# Patient Record
(demographics unavailable — no encounter records)

---

## 2024-10-30 NOTE — REASON FOR VISIT
[Consultation Follow Up] : a consultation follow up  [Mother] : mother [Medical Records] : medical records [Home] : at home, [unfilled] , at the time of the visit. [Medical Office: ([facility])___] : at the medical office located in  [Other:____] : [unfilled] [FreeTextEntry3] : Aunt

## 2024-10-30 NOTE — REASON FOR VISIT
[Consultation Follow Up] : a consultation follow up  [Mother] : mother [Medical Records] : medical records [Home] : at home, [unfilled] , at the time of the visit. [Medical Office: ([facility])___] : at the medical office located in  [Other:____] : [unfilled] [FreeTextEntry3] : Aunt

## 2024-10-30 NOTE — HISTORY OF PRESENT ILLNESS
[de-identified] : Ammon is a 7-year-old male with history SCN2a mutation, autism, seizure disorder with vagal nerve stimulator, MBSS with aspiration of PO feedings and now s/p GT placement in February 2023 he is being seen by telehealth visit today for GI follow up. Last seen via Telehealth on 5/8/2024.  Completed intensive outpatient feeding therapy program at [facility] which was 5 days/week for 6-8 weeks.   Ammon is receiving small amounts of yogurt PO. No coughing choking gagging or distress. Receiving GT feedings of KetoVie as prescribed per neurology dietitian via bolus with syringe. The ketogenic diet was initiated in August 2023. He was previously receiving [employer] [facility] 1.2kcal/ounce. Tolerating GT feedings very well without emesis, abdominal distention, choking, gagging, cyanosis or respiratory distress.  Flushes with water after GT feeding. She uses an ace bandage to anchor the GT and ensure he does not pull on it.  BM's every other day. Taking Miralax 1/2 capful daily. Using glycerin suppositories as needed. If skipping >2 days will increase Miralax to 1 capful. Stool consistency ranges from hard- soft/ loose. No mucus or blood.  No abdominal pain/ discomfort. Sometimes will point to his abdomen.  No issues with GT, exchanged every 3-4 months with [facility] surgery team.   Seizures are under good control. Mother notes a dramatic improvement in his behavior and demeanor. He is calm and less aggressive.   H/X: Had an MBSS in August 2022 which showed silent aspiration with all consistencies. 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 His most recent MBSS 1/24/23 also showed aspiration and penetration with all consistencies. Per SLP note a safe oral diet cannot be recommended at this time. Recommend long term non-oral means of nutrition and hydration. He is now s/p laproscopic GT placement at [facility] on 2/8/23 without complication.   DME: [facility]

## 2024-10-30 NOTE — HISTORY OF PRESENT ILLNESS
[de-identified] : Ammon is a 7-year-old male with history SCN2a mutation, autism, seizure disorder with vagal nerve stimulator, MBSS with aspiration of PO feedings and now s/p GT placement in February 2023 he is being seen by telehealth visit today for GI follow up. Last seen via Telehealth on 5/8/2024.  Completed intensive outpatient feeding therapy program at [facility] which was 5 days/week for 6-8 weeks.   Ammon is receiving small amounts of yogurt PO. No coughing choking gagging or distress. Receiving GT feedings of KetoVie as prescribed per neurology dietitian via bolus with syringe. The ketogenic diet was initiated in August 2023. He was previously receiving [employer] [facility] 1.2kcal/ounce. Tolerating GT feedings very well without emesis, abdominal distention, choking, gagging, cyanosis or respiratory distress.  Flushes with water after GT feeding. She uses an ace bandage to anchor the GT and ensure he does not pull on it.  BM's every other day. Taking Miralax 1/2 capful daily. Using glycerin suppositories as needed. If skipping >2 days will increase Miralax to 1 capful. Stool consistency ranges from hard- soft/ loose. No mucus or blood.  No abdominal pain/ discomfort. Sometimes will point to his abdomen.  No issues with GT, exchanged every 3-4 months with [facility] [place] [place] surgery team.   Seizures are under good control. Mother notes a dramatic improvement in his behavior and demeanor. He is calm and less aggressive.   H/X: Had an MBSS in August 2022 which showed silent aspiration with all consistencies. 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 His most recent MBSS 1/24/23 also showed aspiration and penetration with all consistencies. Per SLP note a safe oral diet cannot be recommended at this time. Recommend long term non-oral means of nutrition and hydration. He is now s/p laproscopic GT placement at [facility] [place] [place] on 2/8/23 without complication.   DME: [facility]

## 2024-10-30 NOTE — PHYSICAL EXAM
[Well Developed] : well developed [Well Nourished] : well nourished [Feeding Tube] : There was a feeding tube  [___F] : [unfilled] F [___cm] : [unfilled] cm [Clean] : clean [Dry] : dry [Interactive] : interactive [Respiratory Distress] : no respiratory distress  [Distended] : non distended [Erythema] : no erythema [FreeTextEntry1] : very active  [FreeTextEntry2] : SHREYAST MINI [de-identified] : baseline behavior

## 2024-10-30 NOTE — CONSULT LETTER
[Dear  ___] : Dear  [unfilled], [Consult Letter:] : I had the pleasure of evaluating your patient, [unfilled]. [Please see my note below.] : Please see my note below. [Consult Closing:] : Thank you very much for allowing me to participate in the care of this patient.  If you have any questions, please do not hesitate to contact me. [Sincerely,] : Sincerely, [FreeTextEntry3] : Carol Haq, RN, MSN, CPNP  Certified Pediatric Nurse Practitioner

## 2024-10-30 NOTE — PHYSICAL EXAM
[Well Developed] : well developed [Well Nourished] : well nourished [Feeding Tube] : There was a feeding tube  [___F] : [unfilled] F [___cm] : [unfilled] cm [Clean] : clean [Dry] : dry [Interactive] : interactive [Respiratory Distress] : no respiratory distress  [Distended] : non distended [Erythema] : no erythema [FreeTextEntry1] : very active  [FreeTextEntry2] : SHREYAST MINI [de-identified] : baseline behavior

## 2024-10-30 NOTE — ASSESSMENT
[Educated Patient & Family about Diagnosis] : educated the patient and family about the diagnosis [FreeTextEntry1] : 7-year-old male with history SCN2a mutation, autism, seizure disorder with vagal nerve stimulator, MBSS with aspiration of PO feedings and now s/p GT placement in February 2023 here today for GI follow up via telehealth. He is doing well and tolerating current GT feeding regimen with the Ketogenic diet which is managed by Neurology. He continues with chronic constipation managed well on daily Miralax. No other active GI issues and weight gain has been appropriate for age.   Plan: -Continue current GT regimen with Ketogenic diet. Adjustments to be made by RD from Neurology.  -Continue Miralax, titration instructions reviewed. -Renewed scripts and sent to [facility].  -Follow up visit to be scheduled in 6 months with NP. -Call sooner with questions, concerns or change in clinical status.

## 2024-10-30 NOTE — ASSESSMENT
[Educated Patient & Family about Diagnosis] : educated the patient and family about the diagnosis [FreeTextEntry1] : 7-year-old male with history SCN2a mutation, autism, seizure disorder with vagal nerve stimulator, MBSS with aspiration of PO feedings and now s/p GT placement in February 2023 here today for GI follow up via telehealth. He is doing well and tolerating current GT feeding regimen with the Ketogenic diet which is managed by Neurology. He continues with chronic constipation managed well on daily Miralax. No other active GI issues and weight gain has been appropriate for age.   Plan: -Continue current GT regimen with Ketogenic diet. Adjustments to be made by RD from Neurology.  -Continue Miralax, titration instructions reviewed. -Renewed scripts and sent to [facility].  -Follow up visit to be scheduled in 6 months with NP. -Call sooner with questions, concerns or change in clinical status.

## 2024-10-31 NOTE — DISCUSSION/SUMMARY
[FreeTextEntry1] : Patient with significant congestion. Complete antibiotic course. Potential side effect of antibiotics includes but not limited to diarrhea.  Recommend supportive care including antipyretics, fluids, OTC cough/cold medications if age-appropriate, and nasal saline followed by nasal suction. Recommended presentation to the ER if concerned about breathing. Return if symptoms worsen or persist.

## 2024-10-31 NOTE — HISTORY OF PRESENT ILLNESS
[EENT/Resp Symptoms] : EENT/RESPIRATORY SYMPTOMS [Nasal congestion] : nasal congestion [___ Week(s)] : [unfilled] week(s) [At Night] : at night [Nasal Congestion] : nasal congestion [Cough] : cough [Shortness of Breath] : shortness of breath [Fever] : no fever [Headache] : no headache [Sore Throat] : no sore throat [Vomiting] : no vomiting [Diarrhea] : no diarrhea

## 2024-11-22 NOTE — HISTORY OF PRESENT ILLNESS
Primary Care Center Phone Number [phone]  Primary Care Center Medication Refill Request Information:  * Please contact your pharmacy regarding ANY request for medication refills.  ** [facility] Prescription Fax = [phone]  * Please allow 3 business days for routine medication refills.  * Please allow 5 business days for controlled substance medication refills.     Primary Care Center Test Result notification information:  *You will be notified with in 7-10 days of your appointment day regarding the results of your test.  If you are on MyChart you will be notified as soon as the provider has reviewed the results and signed off on them.               [facility]         Internal Medicine Resident                   Continuity Clinic    Who We Are    Resident Continuity Clinic is a part of the [facility] Primary Care [facility].  Resident physicians see patients independently and establish a relationship with them over the course of their three-year residency program.  As with the Primary Care Clinic, our Resident Continuity Clinic models a group practice.  If your doctor is not available, you will be able to see another resident physician.  At the end of a resident s training, patients will be transitioned to a new resident physician for ongoing care.     We treat patients with a wide array of medical needs from routine physicals, to acute illnesses, to diabetes and blood pressure management, to complex medical illness.  What is a Resident Physician?    Resident physicians hold medical degrees and are doctors. They are training to become specialists in Internal Medicine. They work under the supervision of board-certified faculty physicians.  Expectations for Your Care    We strive to provide accessible, quality care at all times.    In order to provide this care, it is best to see your primary care resident doctor consistently rather switching between providers.  In the event you do see another physician, you  should schedule a follow-up visit with your usual primary care doctor.    If you are transitioning your care from another clinic, it is helpful to have your records available for your doctor to review.    We do not prescribe controlled substances, such as ADD medications or narcotic pain medications, on your first visit.  We will review your health records and concerns prior to devising a treatment plan with you in order to provide the best care.      Clinic Services     Extended clinic hours; patient  to help navigate your visit;  parking; laboratory and imaging services with evening and weekend hours    Multiple medical and surgical specialties in one building    Complementary services, including Nutrition, Integrative Medicine, Pharmacy consultations, Mental and Behavioral Health, Sports Medicine and Physical Therapy    Thank You    We would like to thank you for choosing the [facility] Julio De La Garza MD   (Male)   Keely Miguel MD  (Female)  Ivonne Worthy MD   (Female)   Sintia Santos MD   (Female)   Justin Macdonald MD  (Male)   Artie Carrasquillo MD  (Male)   Elizabeth Sanz MD    (Female)   Jeff Baumann MD  (Male)   Khalif Osorio MD  (Male)    Zo Light MD  (Female)   Niranjan Choi MD  (Male)   Baldemar Rose MD  (Female)   Westley Chavez MD    (Female)   Samuel Mazariegos MD  (Male)   Valdo Nguyen MD  (Male)   Artie Ovalles MD (Male)   Natan Breaux MD  (Male)   Hilda Monge MD (Female)    Renay Westbrook MD (Female)   Eric Lauren MD  (Male)   Teresa Dumont MD    (Female)   Dalila Davenport MD  (Female)    Supervising  Physicians   MD Dawn Rothman, MD Jerrod Brothers, MD Lauro Wiley, MD Lidia Alonso, MD Pat Toribio, MD Farhan López, MD Marilee Kimble MD             [de-identified] : worsening of cough and congestion  [FreeTextEntry6] : slight fever decrease oral intake states that has been feeling weaker and has hada decrease in oral intake

## 2025-01-03 NOTE — DISCUSSION/SUMMARY
[FreeTextEntry1] : Recommend Mucinex in addition to antibiotics. Return for follow up in 1-2 wks. Albuterol inhalation treatment every 4-6 hours as needed for cough, wheezing. No

## 2025-01-03 NOTE — HISTORY OF PRESENT ILLNESS
[EENT/Resp Symptoms] : EENT/RESPIRATORY SYMPTOMS [Fever] : fever [Nasal congestion] : nasal congestion [Cough] : cough [___ Day(s)] : [unfilled] day(s) [Change in sleep pattern] : change in sleep pattern [Acetaminophen] : acetaminophen [Ibuprofen] : ibuprofen [Active] : active [Known Exposure to COVID-19] : no known exposure to COVID-19 [Hx of recent COVID-19 infection] : no history of recent COVID-19 infection [Wet cough] : wet cough [Wheezing] : no wheezing [Shortness of Breath] : no shortness of breath [Tachypnea] : no tachypnea [Decreased Appetite] : no decreased appetite [Posttussive emesis] : no posttussive emesis [Vomiting] : no vomiting [Diarrhea] : no diarrhea [Decreased Urine Output] : no decreased urine output [Stable] : stable

## 2025-01-03 NOTE — DISCUSSION/SUMMARY
[FreeTextEntry1] : Recommend Mucinex in addition to antibiotics. Return for follow up in 1-2 wks. Albuterol inhalation treatment every 4-6 hours as needed for cough, wheezing.

## 2025-01-09 NOTE — PHYSICAL EXAM
[Clear Rhinorrhea] : clear rhinorrhea [Wheezing] : wheezing [Crackles] : crackles [Belly Breathing] : no belly breathing [Normotonic] : not normotonic [+2 Patella DTR] : patellar DTR not (+)2 [NL] : warm, clear [de-identified] : severe delay

## 2025-01-09 NOTE — PHYSICAL EXAM
[Clear Rhinorrhea] : clear rhinorrhea [Wheezing] : wheezing [Crackles] : crackles [Belly Breathing] : no belly breathing [Normotonic] : not normotonic [+2 Patella DTR] : patellar DTR not (+)2 [NL] : warm, clear [de-identified] : severe delay

## 2025-01-09 NOTE — HISTORY OF PRESENT ILLNESS
[de-identified] : Sick visit pt is nonverbal but feels like in pain  [FreeTextEntry6] : Mother states pt has felt uncomfortable, warm and in pain for the past 2 days. pt is congested. Mother has administered Tylenol and Motrin as well as finished antibiotics on Monday.  states that the fever is still persisting and cough is worse as well

## 2025-01-09 NOTE — HISTORY OF PRESENT ILLNESS
[de-identified] : Sick visit pt is nonverbal but feels like in pain  [FreeTextEntry6] : Mother states pt has felt uncomfortable, warm and in pain for the past 2 days. pt is congested. Mother has administered Tylenol and Motrin as well as finished antibiotics on Monday.  states that the fever is still persisting and cough is worse as well

## 2025-01-22 NOTE — PHYSICAL EXAM
[Alert] : alert [No facial asymmetry or weakness] : no facial asymmetry or weakness [No abnormal involuntary movements] : no abnormal involuntary movements [Walks and runs well] : walks and runs well [de-identified] : limited exam due to telehealth, running around during telehealth visit

## 2025-01-22 NOTE — QUALITY MEASURES
[Seizure frequency] : Seizure frequency: Yes [Etiology, seizure type, and epilepsy syndrome] : Etiology, seizure type, and epilepsy syndrome: Yes [Side effects of anti-seizure medications] : Side effects of anti-seizure medications: Yes [Safety and education around seizures] : Safety and education around seizures: Yes [Sudden unexpected death in epilepsy (SUDEP)] : Sudden unexpected death in epilepsy: Not Applicable [Issues around driving] : Issues around driving: Not Applicable [Screening for anxiety, depression] : Screening for anxiety, depression: Yes [Treatment-resistant epilepsy (every visit)] : Treatment-resistant epilepsy (every visit): Yes [Adherence to medication(s)] : Adherence to medication(s): Yes [Counseling for women of childbearing potential with epilepsy (including folic acid supplement)] : Counseling for women of childbearing potential with epilepsy (including folic acid supplement): Not Applicable [Options for adjunctive therapy (Neurostimulation, CBD, Dietary Therapy, Epilepsy Surgery)] : Options for adjunctive therapy (Neurostimulation, CBD, Dietary Therapy, Epilepsy Surgery): Yes [Thyroid profile ordered] : Thyroid profile ordered: Not Applicable

## 2025-01-22 NOTE — ASSESSMENT
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 Exam as documented. Tolerated interrogration of VNS well. Continues to be managed well on current medication regimen. . Remains on ketogenic diet. Will put in order to get med levels and routine labs.

## 2025-01-22 NOTE — PLAN
[FreeTextEntry1] : - Depakene 525 mg TID (56 mg/kg/day) - Clonidine 0.2 mg am and afternoon, 0.3 mg qhs - Clobazam 5 mg/2 mL am 7.5 mg/3 mL pm (0.44 mg/kg/day) - Risperidone 1.5 mg TID - Lamictal 50 mg BID (3.5 mg/kg/day) - Rozerem 8mg qhs - Doxepin 1mL qhs - Valproic acid level, lamotrigine level, clobazam level, CBC, CMP, Vitamin D to be done - Follow up 6 months

## 2025-01-22 NOTE — HISTORY OF PRESENT ILLNESS
[FreeTextEntry1] : IDA BARKER is a 7 year old male with a pmhx of ASD and LAN secondary to pathogenic SCN2A mutation here for a follow up.  Current Medications: Depakene 525 mg TID (56 mg/kg/day) Clonidine 0.2 mg am and afternoon, 0.3 mg qhs Clobazam 5 mg/2 mL am 7.5 mg/3 mL pm (0.44 mg/kg/day) Risperidone 1.5 mg TID Lamictal 50 mg BID (3.5 mg/kg/day) Rozerem 8mg qhs Doxepin 1mL qhs  He is on MAD and maintaining good ketosis per the mother but she has not been checking recently.  He has a VNS, no adverse effects reported.   Interval Hx: Ida has been doing very well. He has remained seizure free. His behavior has been much improved since increasing risperidone. He has occasional staring episodes. Sleep is still an issue despite melatonin, Rozerem and Doxepin.   LAN secondary to pathogenic SCN2A mutation here for follow up. Seizures stable, on ketogenic diet. Last seizure was over one month ago. Concerns for difficulty with sleep initiation and sleep fragmentation. Has tried Doxepin in the past with no success, though it was not trialed consistently and not taken with melatonin. Behaviors are "out of control" per mom. Mom would like psych to be involved again. Was previously on daytrona and reports some improvement. Mom reports withdrawal symptoms from the stimulant so stopped the stimulant. Now with worsening behavior. Doxycycline Pregnancy And Lactation Text: This medication is Pregnancy Category D and not consider safe during pregnancy. It is also excreted in breast milk but is considered safe for shorter treatment courses.

## 2025-01-22 NOTE — PROCEDURE
[Implant Date: ___] : Implant Date: [unfilled] [75%] : 75% [] : Yes [Normal] : Normal [FreeTextEntry1] : [id number] [de-identified] : Joann [FreeTextEntry5] : 1.75 [FreeTextEntry6] : 20 [FreeTextEntry7] : 250 [FreeTextEntry8] : 30 [FreeTextEntry9] : 1.8 [de-identified] : 2 [de-identified] : 500 [de-identified] : 60 [de-identified] : 1.879 [de-identified] : 250 [de-identified] : 60 [de-identified] : 610 [de-identified] : 17.87 [de-identified] : ON [de-identified] : 3 [de-identified] : 50

## 2025-01-22 NOTE — HISTORY OF PRESENT ILLNESS
[FreeTextEntry1] : IDA BARKER is a 7 year old male with a pmhx of ASD and LAN secondary to pathogenic SCN2A mutation here for a follow up.  Current Medications: Depakene 525 mg TID (56 mg/kg/day) Clonidine 0.2 mg am and afternoon, 0.3 mg qhs Clobazam 5 mg/2 mL am 7.5 mg/3 mL pm (0.44 mg/kg/day) Risperidone 1.5 mg TID Lamictal 50 mg BID (3.5 mg/kg/day) Rozerem 8mg qhs Doxepin 1mL qhs  He is on MAD and maintaining good ketosis per the mother but she has not been checking recently.  He has a VNS, no adverse effects reported.   Interval Hx: Ida has been doing very well. He has remained seizure free. His behavior has been much improved since increasing risperidone. He has occasional staring episodes. Sleep is still an issue despite melatonin, Rozerem and Doxepin.   LAN secondary to pathogenic SCN2A mutation here for follow up. Seizures stable, on ketogenic diet. Last seizure was over one month ago. Concerns for difficulty with sleep initiation and sleep fragmentation. Has tried Doxepin in the past with no success, though it was not trialed consistently and not taken with melatonin. Behaviors are "out of control" per mom. Mom would like psych to be involved again. Was previously on daytrona and reports some improvement. Mom reports withdrawal symptoms from the stimulant so stopped the stimulant. Now with worsening behavior.

## 2025-01-22 NOTE — PROCEDURE
[Implant Date: ___] : Implant Date: [unfilled] [75%] : 75% [] : Yes [Normal] : Normal [FreeTextEntry1] : [id number] [de-identified] : Joann [FreeTextEntry5] : 1.75 [FreeTextEntry6] : 20 [FreeTextEntry7] : 250 [FreeTextEntry8] : 30 [FreeTextEntry9] : 1.8 [de-identified] : 2 [de-identified] : 500 [de-identified] : 60 [de-identified] : 1.872 [de-identified] : 250 [de-identified] : 60 [de-identified] : 610 [de-identified] : 17.87 [de-identified] : ON [de-identified] : 3 [de-identified] : 50

## 2025-01-22 NOTE — PHYSICAL EXAM
[Alert] : alert [No facial asymmetry or weakness] : no facial asymmetry or weakness [No abnormal involuntary movements] : no abnormal involuntary movements [Walks and runs well] : walks and runs well [de-identified] : limited exam due to telehealth, running around during telehealth visit

## 2025-01-22 NOTE — PHYSICAL EXAM
[Alert] : alert [No facial asymmetry or weakness] : no facial asymmetry or weakness [No abnormal involuntary movements] : no abnormal involuntary movements [Walks and runs well] : walks and runs well [de-identified] : limited exam due to telehealth, running around during telehealth visit

## 2025-01-22 NOTE — PROCEDURE
[Implant Date: ___] : Implant Date: [unfilled] [75%] : 75% [] : Yes [Normal] : Normal [FreeTextEntry1] : [id number] [de-identified] : Joann [FreeTextEntry5] : 1.75 [FreeTextEntry6] : 20 [FreeTextEntry7] : 250 [FreeTextEntry8] : 30 [FreeTextEntry9] : 1.8 [de-identified] : 2 [de-identified] : 500 [de-identified] : 60 [de-identified] : 1.877 [de-identified] : 250 [de-identified] : 60 [de-identified] : 610 [de-identified] : 17.87 [de-identified] : ON [de-identified] : 3 [de-identified] : 50

## 2025-02-21 NOTE — ASSESSMENT
[FreeTextEntry1] : IDA is a 6-year-old male with a history SCN2a mutation, autism, seizure disorder with vagal nerve stimulator, MBSS with aspiration of PO feedings s/p GT placement in February 2023 with Dr Long. He was last seen in the office 6/2024 at which time the tube was exchanged and upsized.  He presented today for a routine G tube exchange. Previous button was removed without difficulty and a new 14 Fr x 2.5 cm AMT mini button was placed and 4 cc instilled into the balloon. Gastric contents were aspirated. He tolerated this well.  I counseled Mom to continue checking the water in the balloon twice per month. They will follow-up in 3-4 months for the next G tube exchange. All questions answered and to their satisfaction.

## 2025-02-21 NOTE — HISTORY OF PRESENT ILLNESS
[FreeTextEntry1] : 6-year-old male with history SCN2a mutation, autism, seizure disorder with vagal nerve stimulator, MBSS with aspiration of PO feedings, now s/p GT placement with Dr. Long in February 2023.  He is on a ketogenic diet that is managed by neurology.  He was last seen in office 6/2024 for a partially dislodged G tube. G tube was upsized at that time to a 14Fx2.5cm AMT mini one balloon button. He presents today for a routine G tube exchange. Mother reports he has been tolerating feeds well. She is comfortable switching out the water in the balloon. DME is [facility]. She reports she has all supplies at home including an emergency kit.

## 2025-02-21 NOTE — PHYSICAL EXAM
[NL] : soft, not tender, not distended [TextBox_37] : 14 Fr x 2.5 cm AMT mini one button in place, good fit, rotates easily, skin without redness, irritation, or granulation tissue  14Fx2.3cm AMT mini one balloon button in place, snug fit.  Small rim of scar tissue, no granulation noted.  Surrounding skin clean and dry without surrounding erythema.

## 2025-02-21 NOTE — CONSULT LETTER
[Dear  ___] : Dear  [unfilled], [Courtesy Letter:] : I had the pleasure of seeing your patient, [unfilled], in my office today. [Please see my note below.] : Please see my note below. [Consult Closing:] : Thank you very much for allowing me to participate in the care of this patient.  If you have any questions, please do not hesitate to contact me. [Sincerely,] : Sincerely, [FreeTextEntry2] : Dr. Jones [FreeTextEntry3] : Estella CENTENO, MSN  Department of General Pediatric Surgery & Trauma [place], [place] [address]

## 2025-03-12 NOTE — DISCUSSION/SUMMARY
[FreeTextEntry1] : 7y with cough with sputum   start albuterol every 4 hours, chest pt with oral and nasal suction  Amoxicillin BID  Vicks vapor rubs, increase fluids to er for any respiratory distress   Return to the office for worsening symptoms

## 2025-03-12 NOTE — HISTORY OF PRESENT ILLNESS
[EENT/Resp Symptoms] : EENT/RESPIRATORY SYMPTOMS [Nasal congestion] : nasal congestion [___ Week(s)] : [unfilled] week(s) [Fever] : no fever [Nasal Congestion] : nasal congestion [Cough] : no cough [Vomiting] : no vomiting [Diarrhea] : no diarrhea [FreeTextEntry6] : congestion for 1 week  yellow discharge from nose last night cough - wet sounding  started on nebs albuterol bid

## 2025-04-16 NOTE — HISTORY OF PRESENT ILLNESS
[de-identified] : Home care needed [FreeTextEntry6] : Patient is in need of homecare services due to history of global developmental delay, autism spectrum disorder, ADHD, uncooperative, G-tube dependent, bowel and bladder incontinent, epilepsy. Patient receiving PT, OT and speech therapy twice per week. Tolerating G-tube feeds well. Passing bowel movements well.

## 2025-04-16 NOTE — DISCUSSION/SUMMARY
[FreeTextEntry1] : Patient in need of home care services.  Form completed to receive home care services.

## 2025-06-03 NOTE — HISTORY OF PRESENT ILLNESS
[EENT/Resp Symptoms] : EENT/RESPIRATORY SYMPTOMS [Nasal congestion] : nasal congestion [___ Day(s)] : [unfilled] day(s) [Nasal Congestion] : nasal congestion [Cough] : cough [Fever] : no fever [Eye Itching] : no eye itching [Sore Throat] : no sore throat [Vomiting] : no vomiting [Diarrhea] : no diarrhea [Decreased Urine Output] : no decreased urine output [de-identified] : cough is worsening, getting more wet

## 2025-06-03 NOTE — HISTORY OF PRESENT ILLNESS
[EENT/Resp Symptoms] : EENT/RESPIRATORY SYMPTOMS [Nasal congestion] : nasal congestion [___ Day(s)] : [unfilled] day(s) [Nasal Congestion] : nasal congestion [Cough] : cough [Fever] : no fever [Eye Itching] : no eye itching [Sore Throat] : no sore throat [Vomiting] : no vomiting [Diarrhea] : no diarrhea [Decreased Urine Output] : no decreased urine output [de-identified] : cough is worsening, getting more wet

## 2025-06-03 NOTE — DISCUSSION/SUMMARY
[FreeTextEntry1] :   - Upper Respiratory Infection: Ammon presents with symptoms consistent with an upper respiratory infection, including congestion, cough, and greenish nasal discharge. Given his complex medical history and the persistence of symptoms, treatment is warranted.     - Therapeutic Interventions: Prescribe a 5-day course of antibiotics (different from the previously prescribed amoxicillin) to be administered once daily.     - Increase albuterol nebulizer treatments to 4-5 times a day, every 2 hours, to help break up phlegm.     - Continue saline nebulizer treatments as currently prescribed.     - Diagnostic Tests: No additional tests ordered at this time.     - Referrals: No new referrals at this time.     - Patient Education: Advised to monitor breathing closely and seek emergency care if respiratory distress occurs.     - Follow-Up: Return if symptoms worsen or do not improve by the end of next week. Expect cough to potentially linger, but monitor for decreased phlegm and improved breathing.

## 2025-06-19 NOTE — REASON FOR VISIT
[Follow-up - Scheduled] : a follow-up, scheduled visit for [FreeTextEntry3] : GT exchange [Mother] : mother

## 2025-06-19 NOTE — ASSESSMENT
[FreeTextEntry1] : 6-year-old male with history SCN2a mutation, autism, seizure disorder with vagal nerve stimulator, MBSS with aspiration of PO feedings, now s/p GT placement with Dr. Long in February 2023. He is on a ketogenic diet that is managed by neurology. Last seen in the office in February 2025 for GT exchange and returns today for his four-month exchange. He currently has a 14Fx2.5cm AMT in place.  Today I exchanged his tube for a new 14Fx2.5cm AMT mini one balloon button.  He tolerated the procedure well.  4cc of water placed in the balloon and good gastric return obtained.  Mom counseled on the importance of checking the water in the balloon biweekly.  Follow up in 4 months for next exchange with his spare tube from home.   All questions answered, follow up arranged.

## 2025-06-19 NOTE — PHYSICAL EXAM
[NL] : grossly intact [TextBox_37] : 14Fx2.5cm AMT mini one balloon button in place, good fit, rotates easily.  No surrounding granulation or skin excoriation. No leaking.

## 2025-06-19 NOTE — HISTORY OF PRESENT ILLNESS
[FreeTextEntry1] : 6-year-old male with history SCN2a mutation, autism, seizure disorder with vagal nerve stimulator, MBSS with aspiration of PO feedings, now s/p GT placement with Dr. Long in February 2023. He is on a ketogenic diet that is managed by neurology. Last seen in the office in February 2025 for GT exchange and returns today for his four-month exchange.  He currently has a 14Fx2.5cm AMT in place. Mom reports no new issues with his tube. She occasionally checks the water in the balloon and keeps it secure with an ace bandage.  [facility]